# Patient Record
Sex: FEMALE | Race: OTHER | Employment: UNEMPLOYED | URBAN - METROPOLITAN AREA
[De-identification: names, ages, dates, MRNs, and addresses within clinical notes are randomized per-mention and may not be internally consistent; named-entity substitution may affect disease eponyms.]

---

## 2022-03-03 ENCOUNTER — OFFICE VISIT (OUTPATIENT)
Dept: FAMILY MEDICINE CLINIC | Age: 22
End: 2022-03-03
Payer: MEDICAID

## 2022-03-03 VITALS
HEIGHT: 62 IN | OXYGEN SATURATION: 98 % | BODY MASS INDEX: 23.92 KG/M2 | SYSTOLIC BLOOD PRESSURE: 113 MMHG | WEIGHT: 130 LBS | HEART RATE: 73 BPM | RESPIRATION RATE: 16 BRPM | TEMPERATURE: 98.7 F | DIASTOLIC BLOOD PRESSURE: 77 MMHG

## 2022-03-03 DIAGNOSIS — R53.83 FATIGUE, UNSPECIFIED TYPE: ICD-10-CM

## 2022-03-03 DIAGNOSIS — F39 MOOD DISORDER (HCC): Primary | ICD-10-CM

## 2022-03-03 DIAGNOSIS — D64.9 ANEMIA, UNSPECIFIED TYPE: ICD-10-CM

## 2022-03-03 PROCEDURE — 99204 OFFICE O/P NEW MOD 45 MIN: CPT | Performed by: FAMILY MEDICINE

## 2022-03-03 NOTE — PROGRESS NOTES
Dk Allen (: 2000) is a 20 y.o. female, established patient, here for evaluation of the following chief complaint(s):  Establish Care (new patient), Depression (mood swings), Labs (wants thyroid labs drawn), and Attention Deficit Disorder         ASSESSMENT/PLAN:  Below is the assessment and plan developed based on review of pertinent history, physical exam, labs, studies, and medications. 1. Mood disorder ([address]  -Start Margretta Bilvenra for mood disorder, features consistent with Bipolar depression.   -Provided patient with mental health practices in the area. Encourage to go to [facility] services during walk in hours tomorrow for further assessment and to get set up with resources.   -Provided with list of local psychiatrists to call to establish care. Advised patient she needs to establish with psychiatry for long term treatment.   -Advised she needs to establish care with counselor as well. Went over [employer] to find counselor.  -Patient has suicidal thoughts monthly but is not experiencing thoughts of harming her self or others currently. She has no plan to harm herself or others. She does not self harm. - Discussed with patient as mood improves with medication potential for suicidal thoughts to worsen. If experiencing, advised to seek help immediately. Provided with suicide hotline and advised to call 911 or go to ED if needed for worsening thoughts of harming self or development of agitation, irritability, hostility, juan or hypomania or other severe symptoms  -Advise to not smoke marijuana or drink alcohol while starting vraylar   -     cariprazine (Vraylar) 1.5 mg capsule; Take 1 Capsule by mouth daily. , Normal, Disp-30 Capsule, R-0  -     REFERRAL TO PSYCHIATRY    2. Anemia, unspecified type  History of anemia, likely iron deficiency anemia secondary to heavy menstrual cycles. -Check labs today. Alter management based on results.   -     CBC WITH AUTOMATED DIFF; Future  -     IRON PROFILE; Future  -     FERRITIN; Future    3. Fatigue, unspecified type  DDX includes secondary to mood disorder, anemia, thyroid abnormalities. Check labs today as well as begin treatment for mood disorder, see above. -     CBC WITH AUTOMATED DIFF; Future  -     METABOLIC PANEL, COMPREHENSIVE; Future  -     TSH 3RD GENERATION; Future  -     IRON PROFILE; Future  -     FERRITIN; Future    Goes to [employer] studying creating writing. Return in about 2 weeks (around 3/17/2022) for can be virtual.      SUBJECTIVE/OBJECTIVE:  Chief Complaint   Patient presents with   [address]     new patient    Depression     mood swings    Labs     wants thyroid labs drawn    Attention Deficit Disorder     Patient is a 62-year-old female with a history of anemia presented to office to discuss mood. Patient states 2 years ago she had blood work done that showed she was mildly anemic, however she has had no follow-up from this. Patient endorses feeling fatigued throughout the day, craving ice, notes her eyebrows feel like they are thinning. Reports she has heavy menstrual cycles first 2 to 3 days of each menstrual cycle are very heavy bleeding. Her periods last 7 days. Reports she has a significant family history of thyroid disease as but well does not remember having her thyroid checked. She is wondering if this could be playing a part in her daily fatigue. For mood, patient notes her entire life she has had feelings of being down or depressed. 6 years ago she saw a psychiatrist who suggested she had PMDD. Suggested that she start an antipsychotic medication Zyprexa. Patient notes she has daily feelings of being down depressed or hopeless with notes around her menstrual cycle is feeling intensifies. She has spiral thinking, will begin to think there is no point to be on this earth, and that she would be better off dead. She denies having any plan.   She denies any intent to harm herself or others today. States whenever she gets to the point where she would start thing about a plan she is able to stop herself, distract her self, and be with her friends and family. Reports over the last several years that she has episodes every few months where she feels like her mood is heightened. Reports she will have a lot of energy and be able to go a mile a minute, does not need much sleep. Denies impulsivity, denies risky sexual behaviors, denies illicit drug use. She has never been hospitalized for mood. She smokes marijuana twice per month, and drinks 1 alcoholic beverage per day. She does not engage in any self-harm behaviors. Reports that she sees people out of her periphery and hears people saying Carmelo Sanchez look over here\" once per week. Last occurrence was last week. When she turns to look no one is there. Others around her do not see person or do not hear voice. She has seen a counselor in the past but is not seeing a counselor currently. Family history is significant for bipolar disorder. She lives with girlfriend. Psychologist told her in the past that she may have ADD. She does feel easily distracted in school but has not received formal neuropsych testing. She is currently living in [place] and goes to [employer], she is studying Creative writing. She lives with her girlfriend who is a great support system. Review of Systems   Constitutional: Positive for fatigue. Negative for unexpected weight change. Eyes: Negative for photophobia. Respiratory: Negative for choking, chest tightness and shortness of breath. Cardiovascular: Negative for chest pain. Gastrointestinal: Negative for abdominal distention, abdominal pain, constipation and diarrhea. Skin: Negative for rash. Neurological: Negative for seizures and light-headedness. Psychiatric/Behavioral: Positive for behavioral problems, hallucinations and suicidal ideas.  Negative for confusion, self-injury and sleep disturbance. Current Outpatient Medications on File Prior to Visit   Medication Sig Dispense Refill    ondansetron (ZOFRAN ODT) 4 mg disintegrating tablet Take 1 Tab by mouth every eight (8) hours as needed for Nausea. 6 Tab 0     No current facility-administered medications on file prior to visit. There are no problems to display for this patient. Allergies   Allergen Reactions    Latex Itching     Social History     Socioeconomic History    Marital status: SINGLE   Tobacco Use    Smoking status: Never Smoker    Smokeless tobacco: Never Used   Substance and Sexual Activity    Alcohol use: Yes     Alcohol/week: 6.0 standard drinks     Types: 6 Standard drinks or equivalent per week     Comment: weekends    Drug use: Yes     Frequency: 1.0 times per week     Types: Marijuana     Comment: uses once or twice per month    Sexual activity: Yes     Partners: Female     Birth control/protection: None     Family History   Problem Relation Age of Onset    Hypertension Mother     Anemia Mother     Cancer Mother     Breast Cancer Mother     Depression Mother     Thyroid Disease Mother     Anxiety Father     ADD / ADHD Brother     Hypertension Maternal Grandmother     Alcohol abuse Maternal Grandfather        Vitals:    03/03/22 1517   BP: 113/77   Pulse: 73   Resp: 16   Temp: 98.7 °F (37.1 °C)   SpO2: 98%   Weight: 130 lb (59 kg)   Height: 5' 2\" (1.575 m)   PainSc:   0 - No pain        Physical Exam  Constitutional:       Appearance: Normal appearance. HENT:      Head: Normocephalic and atraumatic. Eyes:      Extraocular Movements: Extraocular movements intact. Conjunctiva/sclera: Conjunctivae normal.      Pupils: Pupils are equal, round, and reactive to light. Cardiovascular:      Rate and Rhythm: Normal rate and regular rhythm. Pulses: Normal pulses. Heart sounds: Normal heart sounds.    Pulmonary:      Effort: Pulmonary effort is normal.      Breath sounds: Normal breath sounds. Abdominal:      General: Abdomen is flat. Bowel sounds are normal.      Palpations: Abdomen is soft. Neurological:      Mental Status: She is alert. Psychiatric:         Attention and Perception: Attention and perception normal.         Mood and Affect: Mood normal.         Behavior: Behavior normal. Behavior is not slowed or hyperactive. Behavior is cooperative. Thought Content: Thought content normal. Thought content is not paranoid or delusional. Thought content does not include homicidal or suicidal ideation. Thought content does not include homicidal or suicidal plan. Cognition and Memory: Cognition and memory normal.         Judgment: Judgment normal. Judgment is not impulsive or inappropriate. An electronic signature was used to authenticate this note.   -- Dalia Gudino PA-C

## 2022-03-03 NOTE — PATIENT INSTRUCTIONS
Go to intake hours at [facility] clinic during walk in hours to establish care. SAME DAY ACCESS    Calling our Intake number at [phone] before walking in is recommended. Physical Address:   Irina [address] [place] [address]    Same Day Access Walk-In Hours for Assessment  Monday 8 a.m. - 2 p.m. Tuesday 8 a.m. - 4 p.m. Wednesday 8 a.m. - 4 p.m. Thursday 8 a.m. - 4 p.m. Friday 8 a.m. - 2 p.m. National Suicide Hotline: 347.833.9813    Suicidal Thoughts and Behavior: Care Instructions  Your Care Instructions  You have been seen by a doctor because you've had thoughts about killing yourself. Maybe you have tried to do it. This is much different from having fleeting thoughts of death, which many people have from time to time. Your doctor and support team will work hard to help keep you safe. Your team may include a , a , and a counselor. Most people who think about suicide don't want to die. They think suicide will end their problems and pain. People who consider suicide often feel hopeless, helpless, and worthless. These thoughts can make a person feel that there is no other choice. But you do have a choice. Help is always available. The doctor and staff members are taking you and your pain very seriously. It is important to remember that there are people who are willing and able to talk with you about your suicidal thoughts. Treatment and close follow-up care can help you feel better about life. Thoughts of hopelessness and suicide may come from being depressed. Depression is a medical condition. When you have depression, there may be problems with activity levels in certain parts of your brain. Chemicals in your brain called neurotransmitters may be out of balance. But depression can be treated. Treatment for depression includes counseling, medicines, and lifestyle changes. With treatment, you can feel better.   Follow-up care is a key part of your treatment and safety. Be sure to make and go to all appointments, and call your doctor if you are having problems. It's also a good idea to know your test results and keep a list of the medicines you take. How can you care for yourself at home? · Talk to someone. Reach out to family members, friends, your doctor, or a counselor. Be open and honest with them about your thoughts and feelings. · Be safe with medicines. Take your medicines exactly as prescribed. Call your doctor if you think you are having a problem with your medicine. · Avoid illegal drugs and alcohol. · Attend all counseling sessions recommended by your doctor. · Have someone take away sharp or dangerous objects, guns, and drugs from your home. · Keep the numbers for these national suicide hotlines: 3-383-926-TALK (5-738.917.6998) and 7-045-CDVYATO (7-796.485.5438). When should you call for help? Call 911 anytime you think you may need emergency care. For example, call if:    · You feel you cannot stop from hurting yourself or someone else. Call your doctor now or seek immediate medical care if:    · You have one or more warning signs of suicide. For example, call if:  ? You feel like giving away your possessions. ? You use illegal drugs or drink alcohol heavily. ? You talk or write about death. This may include writing suicide notes and talking about guns, knives, or pills. ? You start to spend a lot of time alone or spend more time alone than usual.     · You hear voices.     · You start acting in an aggressive way that's not normal for you. Watch closely for changes in your health, and be sure to contact your doctor if you have any problems. Where can you learn more? Go to http://www.gray.com/  Enter N512 in the search box to learn more about \"Suicidal Thoughts and Behavior: Care Instructions. \"  Current as of: June 16, 2021               Content Version: 13.0  © 1726-8902 [employer]wise, Incorporated. Care instructions adapted under license by [employer] (which disclaims liability or warranty for this information). If you have questions about a medical condition or this instruction, always ask your healthcare professional. [address] any warranty or liability for your use of this information.

## 2022-03-03 NOTE — PROGRESS NOTES
Chief Complaint   Patient presents with    Establish Care     new patient    Depression     mood swings    Labs     wants thyroid labs drawn    Attention Deficit Disorder

## 2022-03-04 ENCOUNTER — TELEPHONE (OUTPATIENT)
Dept: FAMILY MEDICINE CLINIC | Age: 22
End: 2022-03-04

## 2022-03-04 LAB
ALBUMIN SERPL-MCNC: 4.4 G/DL (ref 3.9–5)
ALBUMIN/GLOB SERPL: 1.8 {RATIO} (ref 1.2–2.2)
ALP SERPL-CCNC: 62 IU/L (ref 44–121)
ALT SERPL-CCNC: 14 IU/L (ref 0–32)
AST SERPL-CCNC: 19 IU/L (ref 0–40)
BASOPHILS # BLD AUTO: 0.1 X10E3/UL (ref 0–0.2)
BASOPHILS NFR BLD AUTO: 1 %
BILIRUB SERPL-MCNC: 0.5 MG/DL (ref 0–1.2)
BUN SERPL-MCNC: 9 MG/DL (ref 6–20)
BUN/CREAT SERPL: 16 (ref 9–23)
CALCIUM SERPL-MCNC: 9.2 MG/DL (ref 8.7–10.2)
CHLORIDE SERPL-SCNC: 104 MMOL/L (ref 96–106)
CO2 SERPL-SCNC: 19 MMOL/L (ref 20–29)
CREAT SERPL-MCNC: 0.56 MG/DL (ref 0.57–1)
EGFR: 131 ML/MIN/1.73
EOSINOPHIL # BLD AUTO: 0.2 X10E3/UL (ref 0–0.4)
EOSINOPHIL NFR BLD AUTO: 2 %
ERYTHROCYTE [DISTWIDTH] IN BLOOD BY AUTOMATED COUNT: 11.7 % (ref 11.7–15.4)
FERRITIN SERPL-MCNC: 68 NG/ML (ref 15–150)
GLOBULIN SER CALC-MCNC: 2.5 G/DL (ref 1.5–4.5)
GLUCOSE SERPL-MCNC: 75 MG/DL (ref 65–99)
HCT VFR BLD AUTO: 40 % (ref 34–46.6)
HGB BLD-MCNC: 13.3 G/DL (ref 11.1–15.9)
IMM GRANULOCYTES # BLD AUTO: 0 X10E3/UL (ref 0–0.1)
IMM GRANULOCYTES NFR BLD AUTO: 0 %
INTERPRETATION: NORMAL
IRON SATN MFR SERPL: 64 % (ref 15–55)
IRON SERPL-MCNC: 179 UG/DL (ref 27–159)
LYMPHOCYTES # BLD AUTO: 1.8 X10E3/UL (ref 0.7–3.1)
LYMPHOCYTES NFR BLD AUTO: 25 %
MCH RBC QN AUTO: 30.5 PG (ref 26.6–33)
MCHC RBC AUTO-ENTMCNC: 33.3 G/DL (ref 31.5–35.7)
MCV RBC AUTO: 92 FL (ref 79–97)
MONOCYTES # BLD AUTO: 0.4 X10E3/UL (ref 0.1–0.9)
MONOCYTES NFR BLD AUTO: 6 %
NEUTROPHILS # BLD AUTO: 4.8 X10E3/UL (ref 1.4–7)
NEUTROPHILS NFR BLD AUTO: 66 %
PLATELET # BLD AUTO: 255 X10E3/UL (ref 150–450)
POTASSIUM SERPL-SCNC: 4.2 MMOL/L (ref 3.5–5.2)
PROT SERPL-MCNC: 6.9 G/DL (ref 6–8.5)
RBC # BLD AUTO: 4.36 X10E6/UL (ref 3.77–5.28)
SODIUM SERPL-SCNC: 141 MMOL/L (ref 134–144)
TIBC SERPL-MCNC: 280 UG/DL (ref 250–450)
TSH SERPL DL<=0.005 MIU/L-ACNC: 1.37 UIU/ML (ref 0.45–4.5)
UIBC SERPL-MCNC: 101 UG/DL (ref 131–425)
WBC # BLD AUTO: 7.2 X10E3/UL (ref 3.4–10.8)

## 2022-03-04 NOTE — PROGRESS NOTES
I attempted to call patient- no answer and left message for return call. Please try to reach again    Labs show she is not anemic. However some of her iron levels were actually high. Has she been taking anything with iron in it? This could be a multivitamin with iron, or a supplement called ferrous sulfate, ferrous  gluconate, ferric maltol? Any family history of hemachromatosis (excess iron in their bodies)? Her thyroid level was normal, kidney, liver, electrolytes look good.

## 2022-03-07 NOTE — TELEPHONE ENCOUNTER
See result note with lab recommendations and further questions-- can you try to reach patient today to discuss?

## 2022-03-07 NOTE — TELEPHONE ENCOUNTER
Can you call her back and advise I recommend she stop the multivitamin with iron-- she can switch to one without iron. Its likely her iron is too high because she is taking the supplement but her body does not need the extra iron. We should recheck her iron levels in 2 to 3 months. If persistently elevated additional workup may be needed.

## 2022-03-07 NOTE — TELEPHONE ENCOUNTER
2nd attempt. Called and spoke with patient in reference to labs. She states that she used to be on a multi vitamin with iron in it awhile ago when she received the initial anemia diagnosis. She is unsure of how long it has been but is not recent. She is unaware of an family hx of hemochromatosis.

## 2022-03-07 NOTE — PROGRESS NOTES
Pt id x 3, notified as per Khadijah Whitaker and verbalized understanding. States that she is on no supplements of any kind and has no family hx of hematochromatosis.

## 2022-03-16 ENCOUNTER — VIRTUAL VISIT (OUTPATIENT)
Dept: FAMILY MEDICINE CLINIC | Age: 22
End: 2022-03-16
Payer: MEDICAID

## 2022-03-16 DIAGNOSIS — F39 MOOD DISORDER (HCC): Primary | ICD-10-CM

## 2022-03-16 DIAGNOSIS — R79.0 ABNORMAL SERUM IRON LEVEL: ICD-10-CM

## 2022-03-16 PROCEDURE — 99214 OFFICE O/P EST MOD 30 MIN: CPT | Performed by: FAMILY MEDICINE

## 2022-03-16 NOTE — PROGRESS NOTES
Devon Black (: 2000) is a 20 y.o. female, established patient, here for evaluation of the following chief complaint(s):   Medication Evaluation       ASSESSMENT/PLAN:  Below is the assessment and plan developed based on review of pertinent history, labs, studies, and medications. 1. Mood disorder ([address]  Symptoms consistent with Bipolar depression. Unable to start vraylar due to cost. Start latuda instead. 20mg with dinner, patient would like to avoid AM dosing of antipsychotic due to already feeling tired    Provided with list of local psychiatrists again to call to establish care. Advised patient she needs to establish with psychiatry for long term treatment.     -Advised she needs to establish care with counselor as well. Went over [employer] to find counselor.    -Patient has suicidal thoughts monthly but is not experiencing thoughts of harming her self or others currently. She has no plan to harm herself or others. She does not self harm. - Discussed with patient as mood improves with medication potential for suicidal thoughts to worsen. If experiencing, advised to seek help immediately. Provided with suicide hotline and advised to call 911 or go to ED if needed for worsening thoughts of harming self or development of agitation, irritability, hostility, juan or hypomania or other severe symptoms    -Advise to not smoke marijuana or drink alcohol while starting latuda. Advised to call office if medication is not affordable    -     lurasidone (LATUDA) 20 mg tab tablet; Take 1 Tablet by mouth daily (with dinner). , Normal, Disp-30 Tablet, R-0    2. Abnormal serum iron level  Patient has history of iron deficiency anemia, likely secondary to heavy menstrual cycles, and was previously taking iron supplements. -Iron levels are now elevated. She has not taken supplement in at least 1 month. Recommend she avoid any MVI with iron in them and we recheck labs in 2 months.  If persistently elevated pursue further workup. Lab Results   Component Value Date/Time    Iron 179 (H) 03/03/2022 03:40 PM    TIBC 280 03/03/2022 03:40 PM    Iron % saturation 64 (H) 03/03/2022 03:40 PM    Ferritin 68 03/03/2022 03:40 PM         Return in about 2 weeks (around 3/30/2022) for mood, can be virtual.    SUBJECTIVE/OBJECTIVE:  Chief Complaint   Patient presents with    Medication Evaluation     Patient presents to office to follow up mood. Patient was not able to start [place] last week due to cost $1500 for 30 days and insurance not covering. Patient starts her moods have not been as drastic for the last week as she started her menstrual cycle. However she continues to hear voices shouting in the distance. Denies seeing things others do not in last 2 weeks. Denies hearing voices telling her to harm herself or others. She denies self harm in last week. Denies suicidal ideation in the last week. PHQ 9 screening in office score 17, suicide screen low risk. Feelings of depression remain primary concern with history of hypomanic symptoms. She has not reached out to psychiatrist. She has not found personal counselor-- she does see family counselor with her mom. Recent labwork revealed normal CBC, TSH, CMP. Iron levels were high-- patient has history of iron deficiency anemia. She was taking an iron supplement but has not taken this in at least a month. Denies family history of hemachromatosis or other blood disorders. Living in [address] [place] [address] with her partner who is great support system. Review of Systems   Constitutional: Positive for fatigue. Negative for activity change, appetite change, fever and unexpected weight change. Eyes: Negative for visual disturbance. Respiratory: Negative for shortness of breath. Cardiovascular: Negative for chest pain. Psychiatric/Behavioral: Positive for decreased concentration, hallucinations and suicidal ideas.  Negative for agitation, confusion, dysphoric mood, self-injury and sleep disturbance. The patient is not nervous/anxious and is not hyperactive. There are no problems to display for this patient. Allergies   Allergen Reactions    Latex Itching     History reviewed. No pertinent surgical history. Social History     Socioeconomic History    Marital status: SINGLE   Tobacco Use    Smoking status: Never Smoker    Smokeless tobacco: Never Used   Substance and Sexual Activity    Alcohol use: Yes     Alcohol/week: 6.0 standard drinks     Types: 6 Standard drinks or equivalent per week     Comment: weekends    Drug use: Yes     Frequency: 1.0 times per week     Types: Marijuana     Comment: uses once or twice per month    Sexual activity: Yes     Partners: Female     Birth control/protection: None     Family History   Problem Relation Age of Onset    Hypertension Mother     Anemia Mother     Cancer Mother     Breast Cancer Mother     Depression Mother     Thyroid Disease Mother     Anxiety Father     ADD / ADHD Brother     Hypertension Maternal Grandmother     Alcohol abuse Maternal Grandfather      Lab Results   Component Value Date/Time    WBC 7.2 03/03/2022 03:40 PM    HGB 13.3 03/03/2022 03:40 PM    HCT 40.0 03/03/2022 03:40 PM    PLATELET 837 14/54/0908 03:40 PM    MCV 92 03/03/2022 03:40 PM     Lab Results   Component Value Date/Time    TSH 1.370 03/03/2022 03:40 PM     Lab Results   Component Value Date/Time    Sodium 141 03/03/2022 03:40 PM    Potassium 4.2 03/03/2022 03:40 PM    Chloride 104 03/03/2022 03:40 PM    CO2 19 (L) 03/03/2022 03:40 PM    Glucose 75 03/03/2022 03:40 PM    BUN 9 03/03/2022 03:40 PM    Creatinine 0.56 (L) 03/03/2022 03:40 PM    BUN/Creatinine ratio 16 03/03/2022 03:40 PM    Calcium 9.2 03/03/2022 03:40 PM    Bilirubin, total 0.5 03/03/2022 03:40 PM    Alk.  phosphatase 62 03/03/2022 03:40 PM    Protein, total 6.9 03/03/2022 03:40 PM    Albumin 4.4 03/03/2022 03:40 PM    A-G Ratio 1.8 03/03/2022 03:40 PM    ALT (SGPT) 14 03/03/2022 03:40 PM    AST (SGOT) 19 03/03/2022 03:40 PM       [INSTRUCTIONS:  \"[x]\" Indicates a positive item  \"[]\" Indicates a negative item  -- DELETE ALL ITEMS NOT EXAMINED]    Constitutional: [x] Appears well-developed and well-nourished [x] No apparent distress      [] Abnormal -     Mental status: [x] Alert and awake  [x] Oriented to person/place/time [x] Able to follow commands    [] Abnormal -     Eyes:   EOM    [x]  Normal    [] Abnormal -   Sclera  [x]  Normal    [] Abnormal -          Discharge [x]  None visible   [] Abnormal -     HENT: [x] Normocephalic, atraumatic  [] Abnormal -   [x] Mouth/Throat: Mucous membranes are moist    External Ears [x] Normal  [] Abnormal -    Neck: [x] No visualized mass [] Abnormal -     Pulmonary/Chest: [x] Respiratory effort normal   [x] No visualized signs of difficulty breathing or respiratory distress        [] Abnormal -      Musculoskeletal:   [x] Normal gait with no signs of ataxia         [x] Normal range of motion of neck        [] Abnormal -     Neurological:        [x] No Facial Asymmetry (Cranial nerve 7 motor function) (limited exam due to video visit)          [x] No gaze palsy        [] Abnormal -          Skin:        [x] No significant exanthematous lesions or discoloration noted on facial skin         [] Abnormal -            Psychiatric:       [x] Normal Affect [] Abnormal -        [x] No Hallucinations    Other pertinent observable physical exam findings:-      On this date 03/16/2022 I have spent 20 minutes reviewing previous notes, test results and face to face (virtual) with the patient discussing the diagnosis and importance of compliance with the treatment plan as well as documenting on the day of the visit. Tyree Still, was evaluated through a synchronous (real-time) audio-video encounter. The patient (or guardian if applicable) is aware that this is a billable service, which includes applicable co-pays.  Verbal consent to proceed has been obtained. The visit was conducted pursuant to the emergency declaration under the [facility], [address] [place] [address] authority and the [employer] and [employer] Act. Patient identification was verified, and a caregiver was present when appropriate. The patient was located at home in a state where the provider was licensed to provide care. An electronic signature was used to authenticate this note.   -- Claudia Jordan PA-C

## 2022-03-16 NOTE — PROGRESS NOTES
Chief Complaint   Patient presents with    Medication Evaluation     Pt being seen for med eval  -pt states that she has not started the medication   -pt states that it was 1500    1. Have you been to the ER, urgent care clinic since your last visit? Hospitalized since your last visit? No    2. Have you seen or consulted any other health care providers outside of the [address] [place] [address] since your last visit? Include any pap smears or colon screening.  No     Pt has no other concerns

## 2022-04-20 ENCOUNTER — TELEPHONE (OUTPATIENT)
Dept: FAMILY MEDICINE CLINIC | Age: 22
End: 2022-04-20

## 2022-04-20 DIAGNOSIS — F39 MOOD DISORDER (HCC): ICD-10-CM

## 2022-04-20 NOTE — TELEPHONE ENCOUNTER
I sent in Rx but patient needs follow up visit-- ok for virtual. Will have nursing staff reach out to schedule

## 2022-04-20 NOTE — TELEPHONE ENCOUNTER
----- Message from [facility] sent at 4/19/2022  6:33 PM EDT -----  Subject: Refill Request    QUESTIONS  Name of Medication? lurasidone (LATUDA) 20 mg tab tablet  Patient-reported dosage and instructions? 1x nightly   How many days do you have left? 3  Preferred Pharmacy? [id number]  Pharmacy phone number (if available)? [phone]  ---------------------------------------------------------------------------  --------------  CALL BACK INFO  What is the best way for the office to contact you? OK to leave message on   voicemail  Preferred Call Back Phone Number? [phone]  ---------------------------------------------------------------------------  --------------  SCRIPT ANSWERS  Relationship to Patient?  Self

## 2022-04-27 ENCOUNTER — VIRTUAL VISIT (OUTPATIENT)
Dept: FAMILY MEDICINE CLINIC | Age: 22
End: 2022-04-27
Payer: MEDICAID

## 2022-04-27 DIAGNOSIS — R21 RASH: ICD-10-CM

## 2022-04-27 DIAGNOSIS — F39 MOOD DISORDER (HCC): Primary | ICD-10-CM

## 2022-04-27 PROCEDURE — 99213 OFFICE O/P EST LOW 20 MIN: CPT | Performed by: FAMILY MEDICINE

## 2022-04-27 NOTE — PROGRESS NOTES
Chief Complaint   Patient presents with    Medication Evaluation     Pt being seen for med eval  -pt states that the Tawnya Pratt is working for her  -pt states that she does notice when she misses it    1. Have you been to the ER, urgent care clinic since your last visit? Hospitalized since your last visit? No    2. Have you seen or consulted any other health care providers outside of the [address] [place] [address] since your last visit? Include any pap smears or colon screening.  No     Pt has no other concerns

## 2022-04-27 NOTE — PROGRESS NOTES
Arcadio Miguel (: 2000) is a 20 y.o. female, established patient, here for evaluation of the following chief complaint(s):   Medication Evaluation       ASSESSMENT/PLAN:  Below is the assessment and plan developed based on review of pertinent history, labs, studies, and medications. 1. Mood disorder ([address]  Symptoms consistent with bipolar depression. Significant improvement with Latuda 20mg. No notable side effects   Continue rx. Discussed always taking with dinner not on empty stomach.  -Discussed with patient importance of establishing with psychiatry where she is living in [place]. Patient will call and have appt scheduled by follow up visit. -Encouraged establishing with counselor  Follow up in office 4 to 6 weeks for fasting labs for medication monitoring. 2. Rash  DDx includes tinea corporis vs pityriasis rosea vs other   Difficult to properly evaluate over virtual visit. As patient lives 2 hours from office, [facility] and proper treatment. Patient agrees and plans to [facility] or other urgent care. Follow up 4 to 6 weeks in office with fasting labs-- mood and iron recheck     SUBJECTIVE/OBJECTIVE:  Chief Complaint   Patient presents with    Medication Evaluation     Patient is a 19 yo female presenting to office to follow up mood. Started Latuda 20mg 6 weeks ago for symptoms consistent with bipolar depression. Notes her mood has dramatically improved since starting this medication. Feels mood is much more stable, less irritable, less feelings of being down/depressed/hopeless. Denies any thoughts of harming herself or others in the last 3 weeks. She is sleeping well- 7 to 9 hours per night. No nightmares. Some vivid dreams, but pleasant dreams. She has not established with psychiatry. She is not seeing a counselor. Mild nausea when she does not take medication with food. No vomiting or diarrhea.   No muscle tremors or twitches. No confusion or disorientation. No chest pains, palpitations, dizziness, vision changes. Patient also notes 3 weeks ago she went on a trip to [place]. Came home with pruritic rash on elbows and wrists. Patient first visit dx with tinea corporis and tx with clotrimazole cream. Rash now spread across chest, back, buttocks, and thighs. Pruritic, hyperpigmented. Lesions on wrist and elbows improved with clotrimazole but did not resolve. Current Outpatient Medications on File Prior to Visit   Medication Sig Dispense Refill    lurasidone (LATUDA) 20 mg tab tablet Take 1 Tablet by mouth daily (with dinner). 30 Tablet 0    [DISCONTINUED] ondansetron (ZOFRAN ODT) 4 mg disintegrating tablet Take 1 Tab by mouth every eight (8) hours as needed for Nausea. (Patient not taking: Reported on 3/16/2022) 6 Tab 0     No current facility-administered medications on file prior to visit.      Patient Active Problem List    Diagnosis Date Noted    [facility] ([facility] 04/27/2022       [INSTRUCTIONS:  \"[x]\" Indicates a positive item  \"[]\" Indicates a negative item  -- DELETE ALL ITEMS NOT EXAMINED]    Constitutional: [x] Appears well-developed and well-nourished [x] No apparent distress      [] Abnormal -     Mental status: [x] Alert and awake  [x] Oriented to person/place/time [x] Able to follow commands    [] Abnormal -     Eyes:   EOM    [x]  Normal    [] Abnormal -   Sclera  [x]  Normal    [] Abnormal -          Discharge [x]  None visible   [] Abnormal -     HENT: [x] Normocephalic, atraumatic  [] Abnormal -   [x] Mouth/Throat: Mucous membranes are moist    External Ears [x] Normal  [] Abnormal -    Neck: [x] No visualized mass [] Abnormal -     Pulmonary/Chest: [x] Respiratory effort normal   [x] No visualized signs of difficulty breathing or respiratory distress        [] Abnormal -      Musculoskeletal:   [x] Normal gait with no signs of ataxia         [x] Normal range of motion of neck        [] Abnormal -     Neurological:        [x] No Facial Asymmetry (Cranial nerve 7 motor function) (limited exam due to video visit)          [x] No gaze palsy        [] Abnormal -          Skin:        [] No significant exanthematous lesions or discoloration noted on facial skin         [x] Abnormal - Appears to be many hyperpigmented patches across back and arms. Difficult to visualize through virtual visit. Psychiatric:       [x] Normal Affect [] Abnormal -        [x] No Hallucinations    Other pertinent observable physical exam findings:-    On this date 04/27/2022 I have spent 20 minutes reviewing previous notes, test results and face to face (virtual) with the patient discussing the diagnosis and importance of compliance with the treatment plan as well as documenting on the day of the visit. Dre Nick, was evaluated through a synchronous (real-time) audio-video encounter. The patient (or guardian if applicable) is aware that this is a billable service, which includes applicable co-pays. Verbal consent to proceed has been obtained. The visit was conducted pursuant to the emergency declaration under the [address] [place] [address] [facility] authority and the [employer] Act. Patient identification was verified, and a caregiver was present when appropriate. The patient was located at home in a state where the provider was licensed to provide care. An electronic signature was used to authenticate this note.   -- Blake Montez PA-C

## 2022-05-23 DIAGNOSIS — F39 MOOD DISORDER (HCC): ICD-10-CM

## 2022-05-23 RX ORDER — LURASIDONE HYDROCHLORIDE 20 MG/1
TABLET, FILM COATED ORAL
Qty: 30 TABLET | Refills: 0 | Status: SHIPPED | OUTPATIENT
Start: 2022-05-23

## 2022-06-01 ENCOUNTER — OFFICE VISIT (OUTPATIENT)
Dept: FAMILY MEDICINE CLINIC | Age: 22
End: 2022-06-01
Payer: MEDICAID

## 2022-06-01 VITALS
TEMPERATURE: 98.4 F | RESPIRATION RATE: 18 BRPM | SYSTOLIC BLOOD PRESSURE: 91 MMHG | HEART RATE: 72 BPM | WEIGHT: 137 LBS | BODY MASS INDEX: 25.21 KG/M2 | DIASTOLIC BLOOD PRESSURE: 61 MMHG | HEIGHT: 62 IN | OXYGEN SATURATION: 98 %

## 2022-06-01 DIAGNOSIS — F39 MOOD DISORDER (HCC): Primary | ICD-10-CM

## 2022-06-01 DIAGNOSIS — Z51.81 MEDICATION MONITORING ENCOUNTER: ICD-10-CM

## 2022-06-01 DIAGNOSIS — R79.0 ABNORMAL BLOOD LEVEL OF IRON: ICD-10-CM

## 2022-06-01 DIAGNOSIS — R53.82 CHRONIC FATIGUE: ICD-10-CM

## 2022-06-01 PROCEDURE — 99214 OFFICE O/P EST MOD 30 MIN: CPT | Performed by: FAMILY MEDICINE

## 2022-06-01 NOTE — PROGRESS NOTES
Chief Complaint   Patient presents with    Follow-up    Labs     Pt being seen for fuv  -labs    1. Have you been to the ER, urgent care clinic since your last visit? Hospitalized since your last visit? No    2. Have you seen or consulted any other health care providers outside of the [address] [place] [address] since your last visit? Include any pap smears or colon screening.  No     Pt has no other concerns

## 2022-06-01 NOTE — PROGRESS NOTES
Mike Charles (: 2000) is a 20 y.o. female, established patient, here for evaluation of the following chief complaint(s):  Follow-up and Labs         ASSESSMENT/PLAN:  Below is the assessment and plan developed based on review of pertinent history, physical exam, labs, studies, and medications. 1. Mood disorder ([facility]  Mood disorder symptoms consistent with bipolar depression that is well controlled on Latuda. Continue medication.    -She has gained 7 pounds in the last 3 months. Did encourage her to start exercising more regular sleep. We will continue to monitor weight and may consider Sahra Chadwick in the future as a treatment option as she would have failed Seroquel as well as Latuda if [place] stopped due to weight gain. Check labs today for kidney liver electrolyte monitoring, blood count monitoring, cholesterol monitoring. Encourage patient again to establish with psychiatry and counseling in [place]. As she has not done this thus far I will can recommend she go to [facility] mental health intake as she does return to this area frequently to establish with psychiatry and a counselor. 2. Medication monitoring encounter  Monitor labs with antipsychotic usage.  -     METABOLIC PANEL, COMPREHENSIVE; Future  -     CBC WITH AUTOMATED DIFF; Future  -     LIPID PANEL; Future    3. Chronic fatigue  Fatigue has improved with treatment of mood however we will also check vitamin D to see if this could have been contributing.  -     VITAMIN D, 25 HYDROXY; Future    4. Abnormal blood level of iron  Iron levels were elevated 3 months ago. She has not been taking any oral iron in the last 3 months, she was not taking oral iron prior to 3 months ago either. We will repeat her labs today. If iron is persistently elevated will refer to hematology. -     FERRITIN; Future  -     IRON PROFILE; Future      Return in about 3 months (around 2022).       SUBJECTIVE/OBJECTIVE:  Chief Complaint   Patient presents with    Follow-up    Labs     Patient is a 69-year-old female with mood disorder consistent with bipolar depression. She has been taking Latuda 20 mg tablet daily. Reports her mood has been very stable. She denies any thoughts of harming her self or others. Denies any hallucinations or delusions. Reports she has not been feeling down or depressed. Denies any feelings of anxiousness in the last few weeks. Denies any hypomania or juan symptoms in the last month. She has not established with a psychiatrist or counselor in [place] where she lives currently. The nausea she was experiencing with the Latuda resolved when she started taking it with a meal.  She denies any orthostatic symptoms, nausea, vomiting, muscle tremors, inappropriate muscle movements, muscle weakness. Denies any palpitations, chest pain, shortness of breath. She has gained 7 pounds in the last 3 months. She reports she does eat a pretty healthy diet. Breakfast burrito for breakfast, fish rice and a vegetable for dinner. Occasional snacking throughout the day. She drinks predominantly water and sugar-free flavored water. She does walk daily but does not do any rigorous form of exercise. Reports she is sleeping well, 8 to 10 hours per night. She is not waking during the night. She still feels slightly fatigued throughout the day this is dramatically improved since on the [facility]. Review of systems negative other than mentioned in HPI    Current Outpatient Medications on File Prior to Visit   Medication Sig Dispense Refill    Latuda 20 mg tab tablet TAKE 1 TABLET BY MOUTH EVERY DAY WITH DINNER 30 Tablet 0     No current facility-administered medications on file prior to visit.      Patient Active Problem List    Diagnosis Date Noted    [facility] 04/27/2022     Family History   Problem Relation Age of Onset    Hypertension Mother     Anemia Mother     Cancer Mother     Breast Cancer Mother     Depression Mother     Thyroid Disease Mother     Anxiety Father     ADD / ADHD Brother     Hypertension Maternal Grandmother     Alcohol abuse Maternal Grandfather      History reviewed. No pertinent surgical history. Social History     Socioeconomic History    Marital status: SINGLE   Tobacco Use    Smoking status: Never Smoker    Smokeless tobacco: Never Used   Substance and Sexual Activity    Alcohol use: Yes     Alcohol/week: 6.0 standard drinks     Types: 6 Standard drinks or equivalent per week     Comment: weekends    Drug use: Yes     Frequency: 1.0 times per week     Types: Marijuana     Comment: uses once or twice per month    Sexual activity: Yes     Partners: Female     Birth control/protection: None     Allergies   Allergen Reactions    Latex Itching     Vitals:    06/01/22 1029   BP: 91/61   Pulse: 72   Resp: 18   Temp: 98.4 °F (36.9 °C)   TempSrc: Oral   SpO2: 98%   Weight: 137 lb (62.1 kg)   Height: 5' 2\" (1.575 m)   PainSc:   0 - No pain   LMP: 05/15/2022        Physical Exam  Constitutional:       Appearance: Normal appearance. HENT:      Head: Normocephalic and atraumatic. Eyes:      Extraocular Movements: Extraocular movements intact. Conjunctiva/sclera: Conjunctivae normal.      Pupils: Pupils are equal, round, and reactive to light. Neck:      Thyroid: No thyroid mass, thyromegaly or thyroid tenderness. Cardiovascular:      Rate and Rhythm: Normal rate and regular rhythm. Pulses: Normal pulses. Heart sounds: Normal heart sounds. Pulmonary:      Effort: Pulmonary effort is normal.      Breath sounds: Normal breath sounds. Abdominal:      General: Abdomen is flat. Bowel sounds are normal.      Palpations: Abdomen is soft. Musculoskeletal:      Cervical back: Normal range of motion and neck supple. No tenderness. Right lower leg: No edema. Left lower leg: No edema. Skin:     Capillary Refill: Capillary refill takes less than 2 seconds. Neurological:      General: No focal deficit present. Mental Status: She is alert and oriented to person, place, and time. Cranial Nerves: No cranial nerve deficit. Motor: No weakness. Psychiatric:         Mood and Affect: Mood normal.         Behavior: Behavior normal.         An electronic signature was used to authenticate this note.   -- David Sarabia PA-C

## 2022-06-01 NOTE — PATIENT INSTRUCTIONS
Go to intake hours at [facility] clinic during walk in hours to establish care. SAME DAY ACCESS    Calling our Intake number at [phone] before walking in is recommended. Physical Address:   [address]    Same Day Access Walk-In Hours for Assessment  Monday 8 a.m. - 2 p.m. Tuesday 8 a.m. - 4 p.m. Wednesday 8 a.m. - 4 p.m. Thursday 8 a.m. - 4 p.m. Friday 8 a.m. - 2 p.m. Recommend establishing care with OBGYN where you live, local health department is another option, or you can return to our office for pap smear with female provider.

## 2022-06-03 LAB
25(OH)D3+25(OH)D2 SERPL-MCNC: 29.6 NG/ML (ref 30–100)
ALBUMIN SERPL-MCNC: 4.3 G/DL (ref 3.9–5)
ALBUMIN/GLOB SERPL: 2.2 {RATIO} (ref 1.2–2.2)
ALP SERPL-CCNC: 54 IU/L (ref 44–121)
ALT SERPL-CCNC: 13 IU/L (ref 0–32)
AST SERPL-CCNC: 15 IU/L (ref 0–40)
BASOPHILS # BLD AUTO: 0 X10E3/UL (ref 0–0.2)
BASOPHILS NFR BLD AUTO: 1 %
BILIRUB SERPL-MCNC: 0.4 MG/DL (ref 0–1.2)
BUN SERPL-MCNC: 10 MG/DL (ref 6–20)
BUN/CREAT SERPL: 20 (ref 9–23)
CALCIUM SERPL-MCNC: 8.8 MG/DL (ref 8.7–10.2)
CHLORIDE SERPL-SCNC: 103 MMOL/L (ref 96–106)
CHOLEST SERPL-MCNC: 178 MG/DL (ref 100–199)
CO2 SERPL-SCNC: 21 MMOL/L (ref 20–29)
CREAT SERPL-MCNC: 0.5 MG/DL (ref 0.57–1)
EGFR: 135 ML/MIN/1.73
EOSINOPHIL # BLD AUTO: 0.2 X10E3/UL (ref 0–0.4)
EOSINOPHIL NFR BLD AUTO: 3 %
ERYTHROCYTE [DISTWIDTH] IN BLOOD BY AUTOMATED COUNT: 11.6 % (ref 11.7–15.4)
FERRITIN SERPL-MCNC: 40 NG/ML (ref 15–150)
GLOBULIN SER CALC-MCNC: 2 G/DL (ref 1.5–4.5)
GLUCOSE SERPL-MCNC: 83 MG/DL (ref 65–99)
HCT VFR BLD AUTO: 38.5 % (ref 34–46.6)
HDLC SERPL-MCNC: 55 MG/DL
HGB BLD-MCNC: 12.5 G/DL (ref 11.1–15.9)
IMM GRANULOCYTES # BLD AUTO: 0 X10E3/UL (ref 0–0.1)
IMM GRANULOCYTES NFR BLD AUTO: 0 %
IMP & REVIEW OF LAB RESULTS: NORMAL
IRON SATN MFR SERPL: 35 % (ref 15–55)
IRON SERPL-MCNC: 103 UG/DL (ref 27–159)
LDLC SERPL CALC-MCNC: 111 MG/DL (ref 0–99)
LYMPHOCYTES # BLD AUTO: 1.6 X10E3/UL (ref 0.7–3.1)
LYMPHOCYTES NFR BLD AUTO: 33 %
MCH RBC QN AUTO: 30.8 PG (ref 26.6–33)
MCHC RBC AUTO-ENTMCNC: 32.5 G/DL (ref 31.5–35.7)
MCV RBC AUTO: 95 FL (ref 79–97)
MONOCYTES # BLD AUTO: 0.2 X10E3/UL (ref 0.1–0.9)
MONOCYTES NFR BLD AUTO: 5 %
NEUTROPHILS # BLD AUTO: 2.7 X10E3/UL (ref 1.4–7)
NEUTROPHILS NFR BLD AUTO: 58 %
PLATELET # BLD AUTO: 214 X10E3/UL (ref 150–450)
POTASSIUM SERPL-SCNC: 4.2 MMOL/L (ref 3.5–5.2)
PROT SERPL-MCNC: 6.3 G/DL (ref 6–8.5)
RBC # BLD AUTO: 4.06 X10E6/UL (ref 3.77–5.28)
SODIUM SERPL-SCNC: 138 MMOL/L (ref 134–144)
SPECIMEN STATUS REPORT, ROLRST: NORMAL
TIBC SERPL-MCNC: 291 UG/DL (ref 250–450)
TRIGL SERPL-MCNC: 60 MG/DL (ref 0–149)
UIBC SERPL-MCNC: 188 UG/DL (ref 131–425)
VLDLC SERPL CALC-MCNC: 12 MG/DL (ref 5–40)
WBC # BLD AUTO: 4.7 X10E3/UL (ref 3.4–10.8)

## 2022-06-03 NOTE — PROGRESS NOTES
Please call patient and advise  -labs show her kidney, liver and electrolytes are great. -Blood counts are normal   -Her cholesterol is a little high, Seroquel can contribute to high cholesterol. She should focus on heart healthy diet and increase exercise to 150 minutes of moderate intensity aerobic activity per week as we discussed in office    -Vitamin D was a little low, recommend starting a once daily over the counter supplement with 600 to 800IU per day. Recheck 3 months. - her iron levels were normal! No further workup required.

## 2024-02-04 ENCOUNTER — OFFICE VISIT (OUTPATIENT)
Age: 24
End: 2024-02-04

## 2024-02-04 VITALS
TEMPERATURE: 98.8 F | HEART RATE: 86 BPM | DIASTOLIC BLOOD PRESSURE: 77 MMHG | SYSTOLIC BLOOD PRESSURE: 111 MMHG | OXYGEN SATURATION: 98 % | HEIGHT: 62 IN | RESPIRATION RATE: 18 BRPM | BODY MASS INDEX: 25.03 KG/M2 | WEIGHT: 136 LBS

## 2024-02-04 DIAGNOSIS — J02.9 SORE THROAT: ICD-10-CM

## 2024-02-04 DIAGNOSIS — B96.89 ACUTE BACTERIAL SINUSITIS: Primary | ICD-10-CM

## 2024-02-04 DIAGNOSIS — J01.90 ACUTE BACTERIAL SINUSITIS: Primary | ICD-10-CM

## 2024-02-04 DIAGNOSIS — R05.1 ACUTE COUGH: ICD-10-CM

## 2024-02-04 LAB
STREP PYOGENES DNA, POC: NEGATIVE
VALID INTERNAL CONTROL, POC: YES

## 2024-02-04 RX ORDER — AZITHROMYCIN 250 MG/1
250 TABLET, FILM COATED ORAL SEE ADMIN INSTRUCTIONS
Qty: 6 TABLET | Refills: 0 | Status: SHIPPED | OUTPATIENT
Start: 2024-02-04 | End: 2024-02-09

## 2024-02-04 RX ORDER — BENZONATATE 100 MG/1
100 CAPSULE ORAL 3 TIMES DAILY PRN
Qty: 30 CAPSULE | Refills: 0 | Status: SHIPPED | OUTPATIENT
Start: 2024-02-04 | End: 2024-02-14

## 2024-02-04 RX ORDER — DEXTROMETHORPHAN HYDROBROMIDE AND PROMETHAZINE HYDROCHLORIDE 15; 6.25 MG/5ML; MG/5ML
5 SYRUP ORAL 4 TIMES DAILY PRN
Qty: 118 ML | Refills: 0 | Status: SHIPPED | OUTPATIENT
Start: 2024-02-04 | End: 2024-02-11

## 2024-12-03 ENCOUNTER — OFFICE VISIT (OUTPATIENT)
Age: 24
End: 2024-12-03

## 2024-12-03 VITALS
OXYGEN SATURATION: 99 % | HEART RATE: 88 BPM | BODY MASS INDEX: 23.19 KG/M2 | DIASTOLIC BLOOD PRESSURE: 80 MMHG | HEIGHT: 62 IN | WEIGHT: 126 LBS | SYSTOLIC BLOOD PRESSURE: 112 MMHG | TEMPERATURE: 98.5 F | RESPIRATION RATE: 16 BRPM

## 2024-12-03 DIAGNOSIS — J02.9 ACUTE VIRAL PHARYNGITIS: Primary | ICD-10-CM

## 2024-12-03 DIAGNOSIS — J02.9 SORE THROAT: ICD-10-CM

## 2024-12-03 LAB
INFLUENZA A ANTIGEN, POC: NEGATIVE
INFLUENZA B ANTIGEN, POC: NEGATIVE
S PYO AG THROAT QL: NORMAL

## 2024-12-04 NOTE — PROGRESS NOTES
12/3/2024   Imelda Martini (: 2000) is a 24 y.o. female, New patient, here for evaluation of the following chief complaint(s):  Cold Symptoms (Pt c/o sore throat, chills, cough, possible fever, fatigue, body aches, congestion ongoing since yesterday. She's been drinking herbal tea with no relief for sore throat. Wants it noted she recently traveled across [facility].)     ASSESSMENT/PLAN:  Below is the assessment and plan developed based on review of pertinent history, physical exam, labs, studies, and medications.       Assessment & Plan  Acute viral pharyngitis  Rapid Strep Negative  I considered, but think unlikely, dangerous causes of this patient's symptoms to include acute epiglottitis, bacterial croup, infectious mononucleosis, or peritonsillar abscess.  Patient is nontoxic appearing and not in need of emergent medical intervention.    -Provided reassurance and reassessment  -Discussed over-the-counter medications for symptomatic relief  -Provided educational material and patient instructions  -Discharged patient with instructions to follow-up with pediatrician  -Advised to go immediately to the ED for worsening or persistent symptoms   Sore throat  Negative flu and strep test in clinic today   OTC pain medication for comfort     Handout given with care instructions  OTC for symptom management. Increase fluid intake, ensure adequate nutritional intake.  Follow up with PCP as needed.  Go to ED with development of any acute symptoms.     Follow up:  No follow-ups on file.  Follow up immediately for any new, worsening or changes or if symptoms are not improving over the next 5-7 days.     SUBJECTIVE/OBJECTIVE:    History provided by:  Patient   used: No           Cold Symptoms (Pt c/o sore throat, chills, cough, possible fever, fatigue, body aches, congestion ongoing since yesterday. She's been drinking herbal tea with no relief for sore throat. Wants it noted she recently traveled across

## 2024-12-04 NOTE — PATIENT INSTRUCTIONS
Thank you for visiting [facility] today.    -Tylenol/Ibuprofen for pain/fever  -Throat lozenges or throat sprays may help with discomfort  -Salt water gargles with 1/2 teaspoon to 1 teaspoon of Benadryl  -Soft, cold foods may soothe your throat as well as ice chips  -Increase humidity in house  -Viscous lidocaine gargles, if prescribed  -Follow up with ENT if no improvement    If you begin to have worsening pain, uncontrollable fever greater than 100.4 or difficulty swallowing, please go to the ER.

## 2024-12-06 ASSESSMENT — ENCOUNTER SYMPTOMS
EYES NEGATIVE: 1
RESPIRATORY NEGATIVE: 1
GASTROINTESTINAL NEGATIVE: 1
ALLERGIC/IMMUNOLOGIC NEGATIVE: 1
SORE THROAT: 1

## 2025-03-26 ENCOUNTER — OFFICE VISIT (OUTPATIENT)
Age: 25
End: 2025-03-26

## 2025-03-26 VITALS
WEIGHT: 121.4 LBS | HEART RATE: 76 BPM | DIASTOLIC BLOOD PRESSURE: 68 MMHG | HEIGHT: 62 IN | RESPIRATION RATE: 16 BRPM | TEMPERATURE: 98.8 F | SYSTOLIC BLOOD PRESSURE: 121 MMHG | OXYGEN SATURATION: 98 % | BODY MASS INDEX: 22.34 KG/M2

## 2025-03-26 DIAGNOSIS — J02.9 SORE THROAT: Primary | ICD-10-CM

## 2025-03-26 DIAGNOSIS — R05.1 ACUTE COUGH: ICD-10-CM

## 2025-03-26 LAB
INFLUENZA A ANTIGEN, POC: NEGATIVE
INFLUENZA B ANTIGEN, POC: NEGATIVE
S PYO AG THROAT QL: NORMAL

## 2025-03-26 RX ORDER — GUAIFENESIN/DEXTROMETHORPHAN 100-10MG/5
10 SYRUP ORAL 3 TIMES DAILY PRN
Qty: 120 ML | Refills: 0 | Status: SHIPPED | OUTPATIENT
Start: 2025-03-26 | End: 2025-04-05

## 2025-03-26 RX ORDER — LEVOCETIRIZINE DIHYDROCHLORIDE 5 MG/1
5 TABLET, FILM COATED ORAL NIGHTLY
Qty: 30 TABLET | Refills: 0 | Status: SHIPPED | OUTPATIENT
Start: 2025-03-26

## 2025-03-26 RX ORDER — LIDOCAINE HYDROCHLORIDE 20 MG/ML
15 SOLUTION OROPHARYNGEAL
Qty: 100 ML | Refills: 0 | Status: SHIPPED | OUTPATIENT
Start: 2025-03-26

## 2025-03-26 ASSESSMENT — ENCOUNTER SYMPTOMS
SORE THROAT: 1
SINUS PRESSURE: 0
NAUSEA: 0
SHORTNESS OF BREATH: 0
VOMITING: 0
RHINORRHEA: 1
ABDOMINAL PAIN: 0
COUGH: 1

## 2025-03-26 NOTE — PROGRESS NOTES
Imelda Martini is a 25 y.o. female     New patient, here for evaluation of the following chief complaint(s):  Fever (Pt c/o low grade fever of a 99.5, sx onset last night. Pt states she took a left over antibiotic she had of amoxicillin yesterday. Pt states she was dx with influenza last month. ), Fatigue (Pt c/o fatigue, sx onset 4 days ago. ), Pharyngitis (Pt c/o sore throat, sx onset Monday. Pt states she is having difficulty swallowing as well. ), and Cough (Pt c/o cough, sx onset Monday. Pt states she is also coughing up greenish/red mucus. )        Assessment & Plan :  Visit Diagnoses and Associated Orders         Sore throat    -  Primary    POCT rapid strep A [38542 Custom]      POCT Influenza A/B Antigen [17420 Custom]      Throat culture, comprehensive [00863 Custom]             Acute cough             ORDERS WITHOUT AN ASSOCIATED DIAGNOSIS    guaiFENesin-dextromethorphan (ROBITUSSIN DM) 100-10 MG/5ML syrup [07190]      levocetirizine (XYZAL) 5 MG tablet [56966]      lidocaine viscous hcl (XYLOCAINE) 2 % SOLN solution [99755]          -Strep negative, influenza negative  -Throat culture sent to the lab.  -Symptomatic management discussed.  Patient advised to notify if throat culture indicates need for treatment.   -Patient declined chest x-ray due to cost.      Follow up in 5 days if symptoms persist or if symptoms worsen.  POCT rapid strep A        Component Value Flag Ref Range Units Status    Strep A Ag None Detected      None Detected  Final                  POCT Influenza A/B Antigen        Component    Inflenza A Ag    negative      Influenza B Ag    negative                          HPI:   25 y.o. female presents with symptoms of sore throat, fever, cough productive of blood-tinged mucus and fatigue x 1 day.  Patient reports recent history of influenza, states that she was getting better but yesterday started getting sick again.  Denies shortness of breath, denies chest pain, denies nausea,

## 2025-03-26 NOTE — PATIENT INSTRUCTIONS
You were seen for an upper respiratory infection.  Symptomatic therapy suggested: acetaminophen or ibuprofen for pain or fevers, Robitussin for cough, Sudafed or Mucinex for congestion.  Also recommend Nyquil at night to help with sleep and congestion. Increase fluids, use vaporizer, stay in steamy bathroom tid 15 min prn severe cough, tylenol as needed, rest, avoid smoky areas.  Apply facial warm packs for sinus pain or use nasal saline sprays.       For sore throat can try chloroseptic spray, gargle with salt water, tea with honey.

## 2025-03-28 ENCOUNTER — RESULTS FOLLOW-UP (OUTPATIENT)
Age: 25
End: 2025-03-28

## 2025-03-28 LAB
BACTERIA SPEC CULT: NORMAL
SERVICE CMNT-IMP: NORMAL